# Patient Record
Sex: FEMALE | Race: WHITE | NOT HISPANIC OR LATINO | Employment: OTHER | URBAN - METROPOLITAN AREA
[De-identification: names, ages, dates, MRNs, and addresses within clinical notes are randomized per-mention and may not be internally consistent; named-entity substitution may affect disease eponyms.]

---

## 2021-03-03 ENCOUNTER — TELEPHONE (OUTPATIENT)
Dept: ORTHOPEDIC SURGERY | Facility: CLINIC | Age: 71
End: 2021-03-03

## 2021-03-31 ENCOUNTER — OFFICE VISIT (OUTPATIENT)
Dept: ORTHOPEDIC SURGERY | Facility: CLINIC | Age: 71
End: 2021-03-31

## 2021-03-31 VITALS
BODY MASS INDEX: 32.82 KG/M2 | SYSTOLIC BLOOD PRESSURE: 124 MMHG | DIASTOLIC BLOOD PRESSURE: 92 MMHG | WEIGHT: 204.2 LBS | HEIGHT: 66 IN

## 2021-03-31 DIAGNOSIS — I87.8 VENOUS STASIS: ICD-10-CM

## 2021-03-31 DIAGNOSIS — M25.571 PAIN IN RIGHT ANKLE AND JOINTS OF RIGHT FOOT: Primary | ICD-10-CM

## 2021-03-31 DIAGNOSIS — M76.821 POSTERIOR TIBIAL TENDINITIS OF RIGHT LOWER EXTREMITY: ICD-10-CM

## 2021-03-31 PROCEDURE — 99204 OFFICE O/P NEW MOD 45 MIN: CPT | Performed by: ORTHOPAEDIC SURGERY

## 2021-03-31 RX ORDER — ACETAMINOPHEN 500 MG
500 TABLET ORAL EVERY 6 HOURS PRN
COMMUNITY

## 2021-03-31 RX ORDER — OMEPRAZOLE 20 MG/1
CAPSULE, DELAYED RELEASE ORAL
COMMUNITY
Start: 2021-01-21

## 2021-03-31 RX ORDER — MULTIPLE VITAMINS W/ MINERALS TAB 9MG-400MCG
1 TAB ORAL DAILY
COMMUNITY

## 2021-03-31 RX ORDER — LEVOTHYROXINE SODIUM 0.07 MG/1
TABLET ORAL
COMMUNITY
Start: 2021-02-07

## 2021-03-31 RX ORDER — APIXABAN 5 MG/1
TABLET, FILM COATED ORAL
COMMUNITY
Start: 2021-01-03

## 2021-03-31 NOTE — PROGRESS NOTES
NEW PATIENT    Patient: Meena Davey  : 1950    Primary Care Provider: Washington Spence MD    Requesting Provider: As above    Pain of the Right Foot and Pain of the Right Ankle      History    Chief Complaint: Right hindfoot pain    History of Present Illness: This is a very pleasant 71-year-old woman here today for a second opinion regarding right hindfoot pain.  Its been going on more than 2 years.  We have notes from podiatry indicating they were treating her for posterior tibial tendinitis.  She has a brace and custom orthotics.  Unfortunately she did not bring those with her.  She reports they make the pain worse.  The records cover the time from May 2019 through 2019, multiple visits and I went through all of them.  They are somewhat difficult to follow.  We do not have any radiographic studies.  She reports that she has pain medially over the right ankle and laterally in the sinus Tarsi region.  It is worse with activity better with rest.  She reports the pain is 7 out of 10 sharp and aching.  She has a history of some type of fracture in the left foot and prior hammertoe and tendon surgery.  She has bilateral bunions also.  She does wear good shoes.    Current Outpatient Medications on File Prior to Visit   Medication Sig Dispense Refill   • acetaminophen (TYLENOL) 500 MG tablet Take 500 mg by mouth Every 6 (Six) Hours As Needed for Mild Pain .     • Eliquis 5 MG tablet tablet      • levothyroxine (SYNTHROID, LEVOTHROID) 75 MCG tablet      • multivitamin with minerals tablet tablet Take 1 tablet by mouth Daily.     • omeprazole (priLOSEC) 20 MG capsule        No current facility-administered medications on file prior to visit.      No Known Allergies   Past Medical History:   Diagnosis Date   • Gout    • H/O blood clots    • Thyroid activity decreased      Past Surgical History:   Procedure Laterality Date   • CHOLECYSTECTOMY     • FOOT SURGERY Left    • HYSTEROTOMY       Family  "History   Problem Relation Age of Onset   • No Known Problems Mother    • No Known Problems Father       Social History     Socioeconomic History   • Marital status:      Spouse name: Not on file   • Number of children: Not on file   • Years of education: Not on file   • Highest education level: Not on file   Tobacco Use   • Smoking status: Former Smoker   • Smokeless tobacco: Never Used   Substance and Sexual Activity   • Alcohol use: Never   • Drug use: Never   • Sexual activity: Defer        Review of Systems   Constitutional: Negative.    HENT: Negative.    Eyes: Negative.    Respiratory: Negative.    Cardiovascular: Negative.    Gastrointestinal: Negative.    Endocrine: Negative.    Genitourinary: Negative.    Musculoskeletal: Positive for arthralgias.   Skin: Negative.    Allergic/Immunologic: Negative.    Neurological: Negative.    Hematological: Negative.    Psychiatric/Behavioral: Negative.        The following portions of the patient's history were reviewed and updated as appropriate: allergies, current medications, past family history, past medical history, past social history, past surgical history and problem list.    Physical Exam:   /92   Ht 167.6 cm (66\")   Wt 92.6 kg (204 lb 3.2 oz)   BMI 32.96 kg/m²   GENERAL: Body habitus: obese    Lower extremity edema: Right: 1+ pitting; Left: 1+ pitting    Varicose veins:  Right: mild; Left: mild    Gait: antalgic with the first few steps     Mental Status:  awake and alert; oriented to person, place, and time    Voice:  clear  SKIN:  Lower extremity: warm and dry    Hair Growth(lower extremity):  Right:diminished; Left:  diminished  NAILS: Toenails: normal  HEENT: Head: Normocephalic, atraumatic,  without obvious abnormality.  eye: normal external eye, no icterus  ears:normal external ears  PULM:  Repiratory effort normal  CV:  Dorsalis Pedis:  Right: 2+; Left:2+    Posterior Tibial: Right:2+; Left:2+    Capillary Refill:  " Brisk  MSK:  Hand:right handed and moderate arthritis      Tibia:  Right:  non tender; Left:  non tender      Ankle:  Right: She is not tender in the ankle itself, she is tender over the talonavicular joint and tender in the sinus Tarsi and at the tip of the fibula, not really tender over the posterior tibial tendon today, the arch does reconstitute with manipulation; Left:  non tender      Foot:  Right:  As above she is tender over the talonavicular joint and the sinus Tarsi and tip of the fibula.  I can manipulate the arch back into normal alignment, it partially reduces with nonweightbearing, fairly significant hallux valgus metatarsus primus varus but the bunion is nontender moderate hammertoes but nontender; Left:  No tenderness, stiff second and third hammertoes that sits dorsiflexed, healed dorsal incisions on second and third toes, fairly significant hallux valgus metatarsus primus varus but not tender over the bunion      NEURO: Heel Walking:  Right:  Difficulty with balance but she can get her forefoot off the ground; Left:  Difficulty with balance but she can get her forefoot off the ground    Toe Walking:  Right:  no inversion with single leg toe raise, positive too many toes sign and limited ability, painful; Left:  no inversion with single leg toe raise     [place] 5.07 monofilament test: normal    Lower extremity sensation: intact     Reflexes:  Biceps:  Right:  not tested; Left:  not tested           Quads:  Right:  not tested; Left:  not tested           Ankle:  Right:  not tested; Left:  not tested      Calf Atrophy:none    Motor Function: all 5/5         Medical Decision Making    Data Review:   ordered and reviewed x-rays today and reviewed outside records I went over the podiatry records, they are confusing but it is evident they were trying to treat posterior tibial tendinitis, for the past 2 years    Assessment and Plan/ Diagnosis/Treatment options:   1. Posterior tibial tendinitis of  right lower extremity  I think this is old posterior tibial tendinitis, she has had symptoms for at least 2 years.  I went over everything with the patient and then she asked me if her sister could get on speaker phone which we did.  I therefore went over the whole problem twice.  I explained posterior tibial tendon dysfunction, I explained how the tendon stretches out and leads to increased flattening of the foot.  I would like to be able to evaluate her brace and orthotic to see how effective they may be.  At this point I think there are 3 options for treatment.  Before we make a treatment decision we need to do an MRI to evaluate the tendon.  I suspect it will be stretched out but not significantly inflamed.  I explained that her pain is mostly over the talonavicular joint and laterally where she has hindfoot impingement, therefore trying to address the tendon might not be very helpful.  I explained that in general if I see somebody with acute posterior tibial tendinitis I put them in a cast for 6 weeks, then a boot with custom orthotic doing PT 6 weeks, then 6 weeks of PT out of the boot.  Given that this is somewhat burned-out I am not certain that regimen would be helpful for her.  Another option for treatment is a PTB brace.  I need to see what type of brace she has.  A third option would be triple arthrodesis.  I went over everything in detail twice both with the patient and then the patient and her sister.  We will obtain an MRI to evaluate the tendon, and at next visit she will bring her orthotics in brace so that I can see it.  - MRI Ankle Right Without Contrast; Future    2. Venous stasis  I explained edema and venous stasis to the patient, and the often hereditary nature of the problem, and the contribution of weight, trauma, etc. I explained how they cause edema (due to gravity, etc) I explained how they can lead to ulceration.  I explained how they can cause pain, stiffness, aching, cramping, etc. I  explained that it is a very very common problem, so common that even [employer] compression stockings.  I recommend wearing support stockings (compression stockings) daily, we discussed what type and where to find them                Radiology Ordered []  Radiology Reports Reviewed []      Radiology Images Reviewed []   Labs Reviewed []    Labs Ordered []   PCP Records Reviewed []    Provider Records Reviewed []    ER Records Reviewed []    Hospital Records Reviewed []    History Obtained From Family []    Phone conversation with Provider []    Records Requested []        Allyson Martinez MD

## 2021-05-13 DIAGNOSIS — M76.821 POSTERIOR TIBIAL TENDINITIS OF RIGHT LOWER EXTREMITY: ICD-10-CM

## 2021-05-13 DIAGNOSIS — M25.571 PAIN IN RIGHT ANKLE AND JOINTS OF RIGHT FOOT: ICD-10-CM

## 2021-05-17 ENCOUNTER — OFFICE VISIT (OUTPATIENT)
Dept: ORTHOPEDIC SURGERY | Facility: CLINIC | Age: 71
End: 2021-05-17

## 2021-05-17 VITALS
DIASTOLIC BLOOD PRESSURE: 79 MMHG | SYSTOLIC BLOOD PRESSURE: 131 MMHG | HEIGHT: 66 IN | WEIGHT: 201.2 LBS | HEART RATE: 92 BPM | BODY MASS INDEX: 32.33 KG/M2

## 2021-05-17 DIAGNOSIS — I87.8 VENOUS STASIS: ICD-10-CM

## 2021-05-17 DIAGNOSIS — M76.821 POSTERIOR TIBIAL TENDINITIS OF RIGHT LOWER EXTREMITY: Primary | ICD-10-CM

## 2021-05-17 PROCEDURE — 99213 OFFICE O/P EST LOW 20 MIN: CPT | Performed by: ORTHOPAEDIC SURGERY

## 2021-05-17 RX ORDER — LEVOCETIRIZINE DIHYDROCHLORIDE 5 MG/1
TABLET, FILM COATED ORAL
COMMUNITY
Start: 2021-04-20 | End: 2022-03-25

## 2021-05-17 NOTE — PROGRESS NOTES
ESTABLISHED PATIENT    Patient: Meena Davey  : 1950    Primary Care Provider: Washington Spence MD    Requesting Provider: As above    Follow-up (MRI (5/10/21) follow up; Posterior tibial tendinitis of right lower extremity)      History    Chief Complaint: Right hindfoot pain    History of Present Illness: She returns for follow-up of the MRI of the right ankle.  It was done at another hospital.  I looked at the films and the report, dated 5/10/2021.  The MRI shows some subchondral edema at the subtalar and calcaneocuboid joint consistent with arthritis as expected, it shows some thickening of the peroneus longus tendon.  It shows fluid around the posterior tibial tendon with some mild central degeneration, indicating that tendinitis is still active.    Current Outpatient Medications on File Prior to Visit   Medication Sig Dispense Refill   • acetaminophen (TYLENOL) 500 MG tablet Take 500 mg by mouth Every 6 (Six) Hours As Needed for Mild Pain .     • Eliquis 5 MG tablet tablet      • levocetirizine (XYZAL) 5 MG tablet      • levothyroxine (SYNTHROID, LEVOTHROID) 75 MCG tablet      • multivitamin with minerals tablet tablet Take 1 tablet by mouth Daily.     • omeprazole (priLOSEC) 20 MG capsule        No current facility-administered medications on file prior to visit.      No Known Allergies   Past Medical History:   Diagnosis Date   • Gout    • H/O blood clots    • Thyroid activity decreased      Past Surgical History:   Procedure Laterality Date   • CHOLECYSTECTOMY     • FOOT SURGERY Left    • HYSTEROTOMY       Family History   Problem Relation Age of Onset   • No Known Problems Mother    • No Known Problems Father       Social History     Socioeconomic History   • Marital status:      Spouse name: Not on file   • Number of children: Not on file   • Years of education: Not on file   • Highest education level: Not on file   Tobacco Use   • Smoking status: Former Smoker   • Smokeless tobacco:  "Never Used   Substance and Sexual Activity   • Alcohol use: Never   • Drug use: Never   • Sexual activity: Defer        Review of Systems   Constitutional: Negative.    HENT: Negative.    Eyes: Negative.    Respiratory: Negative.    Cardiovascular: Negative.    Gastrointestinal: Negative.    Endocrine: Negative.    Genitourinary: Negative.    Musculoskeletal: Positive for arthralgias.   Skin: Negative.    Allergic/Immunologic: Negative.    Neurological: Negative.    Hematological: Negative.    Psychiatric/Behavioral: Negative.        The following portions of the patient's history were reviewed and updated as appropriate: allergies, current medications, past family history, past medical history, past social history, past surgical history and problem list.    Physical Exam:   /79   Pulse 92   Ht 167.6 cm (65.98\")   Wt 91.3 kg (201 lb 3.2 oz)   BMI 32.49 kg/m²   GENERAL: Gait: antalgic       MSK:  Ankle:  Right: Tender over the posterior tib tendon and in the sinus Tarsi;         She is wearing compression socks, swelling is much better controlled    NEURO Sensation:  intact     Medical Decision Making    Data Review:   reviewed radiology images and reviewed radiology results    Assessment/Plan/Diagnosis/Treatment Options:   1. Posterior tibial tendinitis of right lower extremity  The MRI was helpful, it showed her posterior tibial tendinitis is active still.  Therefore I would start treating this as an active posterior tibial tendon problem.  I would recommend the cast for 6 weeks.  It is a short leg fiberglass walking cast and it was applied today.  Once the cast is done she will go in a tall boot with a new custom orthotic.  I gave her prescription for the orthotics, she should make an appointment to be fitted for these the day she comes out of the cast.  She will do 6 weeks of PT in the boot and then 6 weeks out of the boot.  She did bring the braces with her today that were prescribed, they are hard " plastic UCBL type inserts that come up to the malleoli, they are not the type that I think would be comfortable for this problem.  She reports she was unable to wear them.  She got her sister on speaker phone and I went over everything in detail.  I will see her again in 6 weeks for an exam out of the cast    2. Venous stasis  Excellent that she is wearing her compression socks today

## 2021-06-28 ENCOUNTER — OFFICE VISIT (OUTPATIENT)
Dept: ORTHOPEDIC SURGERY | Facility: CLINIC | Age: 71
End: 2021-06-28

## 2021-06-28 VITALS
HEIGHT: 66 IN | SYSTOLIC BLOOD PRESSURE: 140 MMHG | DIASTOLIC BLOOD PRESSURE: 76 MMHG | HEART RATE: 83 BPM | BODY MASS INDEX: 32.3 KG/M2 | WEIGHT: 201 LBS

## 2021-06-28 DIAGNOSIS — I87.8 VENOUS STASIS: ICD-10-CM

## 2021-06-28 DIAGNOSIS — M76.821 POSTERIOR TIBIAL TENDINITIS OF RIGHT LOWER EXTREMITY: Primary | ICD-10-CM

## 2021-06-28 PROCEDURE — 99213 OFFICE O/P EST LOW 20 MIN: CPT | Performed by: ORTHOPAEDIC SURGERY

## 2021-06-28 NOTE — PROGRESS NOTES
ESTABLISHED PATIENT    Patient: Meena Davey  : 1950    Primary Care Provider: Washington Spence MD    Requesting Provider: As above    Follow-up (6 week f/u; Posterior tibial tendinitis of right lower extremity )      History    Chief Complaint: She returns for casting for 6 weeks for right posterior tibial tendinitis she is much improved    History of Present Illness: She returns for casting for 6 weeks right posterior tibial tendinitis, much improved    Current Outpatient Medications on File Prior to Visit   Medication Sig Dispense Refill   • acetaminophen (TYLENOL) 500 MG tablet Take 500 mg by mouth Every 6 (Six) Hours As Needed for Mild Pain .     • Eliquis 5 MG tablet tablet      • levocetirizine (XYZAL) 5 MG tablet      • levothyroxine (SYNTHROID, LEVOTHROID) 75 MCG tablet      • multivitamin with minerals tablet tablet Take 1 tablet by mouth Daily.     • omeprazole (priLOSEC) 20 MG capsule        No current facility-administered medications on file prior to visit.      No Known Allergies   Past Medical History:   Diagnosis Date   • Gout    • H/O blood clots    • Thyroid activity decreased      Past Surgical History:   Procedure Laterality Date   • CHOLECYSTECTOMY     • FOOT SURGERY Left    • HYSTEROTOMY       Family History   Problem Relation Age of Onset   • No Known Problems Mother    • No Known Problems Father       Social History     Socioeconomic History   • Marital status:      Spouse name: Not on file   • Number of children: Not on file   • Years of education: Not on file   • Highest education level: Not on file   Tobacco Use   • Smoking status: Former Smoker   • Smokeless tobacco: Never Used   Substance and Sexual Activity   • Alcohol use: Never   • Drug use: Never   • Sexual activity: Defer        Review of Systems   Constitutional: Negative.    HENT: Negative.    Eyes: Negative.    Respiratory: Negative.    Cardiovascular: Negative.    Gastrointestinal: Negative.    Endocrine:  "Negative.    Genitourinary: Negative.    Musculoskeletal: Positive for arthralgias.   Skin: Negative.    Allergic/Immunologic: Negative.    Neurological: Negative.    Hematological: Negative.    Psychiatric/Behavioral: Negative.        The following portions of the patient's history were reviewed and updated as appropriate: allergies, current medications, past family history, past medical history, past social history, past surgical history and problem list.    Physical Exam:   /76   Pulse 83   Ht 167.6 cm (65.98\")   Wt 91.2 kg (201 lb)   BMI 32.46 kg/m²   GENERAL: Body habitus: obese    Lower extremity edema: Left: none; Right: none    Gait: normal     Mental Status:  awake and alert; oriented to person, place, and time  MSK:  Tibia:  Right:  non tender; Left:  non tender        Ankle:  Right: No longer tender over the posterior tibial tendon; Left:  non tender        Foot:  Right:  non tender; Left:  non tender    NEURO Sensation:  intact    Medical Decision Making    Data Review:   none    Assessment/Plan/Diagnosis/Treatment Options:   1. Posterior tibial tendinitis of right lower extremity  She is much improved.  She will now go in a tall boot and start PT.  She has the prescription for custom orthotic.  She will wear the orthotic in the boot initially.  She will do this for 6 weeks and then 6 more weeks of PT out of the boot.  I will see her when that is complete  - Ambulatory Referral to Physical Therapy    2. Venous stasis  Continue to wear compression stockings        Allyson Martinez MD                      "

## 2021-09-29 ENCOUNTER — OFFICE VISIT (OUTPATIENT)
Dept: ORTHOPEDIC SURGERY | Facility: CLINIC | Age: 71
End: 2021-09-29

## 2021-09-29 VITALS
HEIGHT: 66 IN | WEIGHT: 199.2 LBS | SYSTOLIC BLOOD PRESSURE: 134 MMHG | DIASTOLIC BLOOD PRESSURE: 84 MMHG | BODY MASS INDEX: 32.02 KG/M2 | HEART RATE: 86 BPM

## 2021-09-29 DIAGNOSIS — I87.8 VENOUS STASIS: ICD-10-CM

## 2021-09-29 DIAGNOSIS — M76.821 POSTERIOR TIBIAL TENDINITIS OF RIGHT LOWER EXTREMITY: Primary | ICD-10-CM

## 2021-09-29 PROCEDURE — 99212 OFFICE O/P EST SF 10 MIN: CPT | Performed by: ORTHOPAEDIC SURGERY

## 2021-09-29 NOTE — PROGRESS NOTES
ESTABLISHED PATIENT    Patient: Meena Davey  : 1950    Primary Care Provider: Washington Spence MD    Requesting Provider: As above    Follow-up (3 months- Posterior tibial tendinitis of right lower extremity)      History    Chief Complaint: Right ankle pain    History of Present Illness: She returns for follow-up of treatment for her right posterior tibial tendinitis.  She reports the pain has resolved.  She has her custom orthotics.  She is wearing her compression stockings.    Current Outpatient Medications on File Prior to Visit   Medication Sig Dispense Refill   • acetaminophen (TYLENOL) 500 MG tablet Take 500 mg by mouth Every 6 (Six) Hours As Needed for Mild Pain .     • Eliquis 5 MG tablet tablet      • levocetirizine (XYZAL) 5 MG tablet      • levothyroxine (SYNTHROID, LEVOTHROID) 75 MCG tablet      • multivitamin with minerals tablet tablet Take 1 tablet by mouth Daily.     • omeprazole (priLOSEC) 20 MG capsule        No current facility-administered medications on file prior to visit.      No Known Allergies   Past Medical History:   Diagnosis Date   • Gout    • H/O blood clots    • Thyroid activity decreased      Past Surgical History:   Procedure Laterality Date   • CHOLECYSTECTOMY     • FOOT SURGERY Left    • HYSTEROTOMY       Family History   Problem Relation Age of Onset   • No Known Problems Mother    • No Known Problems Father       Social History     Socioeconomic History   • Marital status:      Spouse name: Not on file   • Number of children: Not on file   • Years of education: Not on file   • Highest education level: Not on file   Tobacco Use   • Smoking status: Former Smoker   • Smokeless tobacco: Never Used   Substance and Sexual Activity   • Alcohol use: Never   • Drug use: Never   • Sexual activity: Defer        Review of Systems   Constitutional: Negative.    HENT: Negative.    Eyes: Negative.    Respiratory: Negative.    Cardiovascular: Negative.   "  Gastrointestinal: Negative.    Endocrine: Negative.    Genitourinary: Negative.    Musculoskeletal: Positive for arthralgias.   Skin: Negative.    Allergic/Immunologic: Negative.    Neurological: Negative.    Hematological: Negative.    Psychiatric/Behavioral: Negative.        The following portions of the patient's history were reviewed and updated as appropriate: allergies, current medications, past family history, past medical history, past social history, past surgical history and problem list.    Physical Exam:   /84   Pulse 86   Ht 167.6 cm (65.98\")   Wt 90.4 kg (199 lb 3.2 oz)   BMI 32.17 kg/m²   GENERAL: Body habitus: overweight    Lower extremity edema: Left: none; Right: none    Gait: normal     Mental Status:  awake and alert; oriented to person, place, and time  MSK:  Tibia:  Right:  non tender; Left:  non tender        Ankle:  Right: non tender; Left:  non tender        Foot:  Right:  non tender over the posterior tibial tendon, she is able to do a single and double leg toe raise; Left:  non tender    NEURO Sensation:  intact    Medical Decision Making    Data Review:   none    Assessment/Plan/Diagnosis/Treatment Options:   1. Posterior tibial tendinitis of right lower extremity  She is significantly improved.  Continue the exercises on her own, continue the orthotics, I will be happy to see her anytime    2. Venous stasis  Definitely continue to wear compression socks        Allyson Martinez MD                      "

## 2022-03-25 ENCOUNTER — OFFICE VISIT (OUTPATIENT)
Dept: ORTHOPEDIC SURGERY | Facility: CLINIC | Age: 72
End: 2022-03-25

## 2022-03-25 VITALS
HEIGHT: 66 IN | WEIGHT: 186 LBS | BODY MASS INDEX: 29.89 KG/M2 | SYSTOLIC BLOOD PRESSURE: 110 MMHG | DIASTOLIC BLOOD PRESSURE: 84 MMHG

## 2022-03-25 DIAGNOSIS — M77.42 METATARSALGIA OF LEFT FOOT: ICD-10-CM

## 2022-03-25 DIAGNOSIS — M79.672 LEFT FOOT PAIN: Primary | ICD-10-CM

## 2022-03-25 DIAGNOSIS — M19.072 ARTHRITIS OF LEFT FOOT: ICD-10-CM

## 2022-03-25 PROCEDURE — 99213 OFFICE O/P EST LOW 20 MIN: CPT | Performed by: PHYSICIAN ASSISTANT

## 2022-03-25 NOTE — PROGRESS NOTES
[facility] Note        Subjective     Pain of the Left Foot      HPI    Meena Davey is a 72 y.o. female.  Patient returns today with new problem.  She is here with complaints of left forefoot pain.  She has pain with weightbearing and walking no rest pain or night pain.  She has developed callus under the second and third metatarsal heads.  She had surgery in the 90s for bunion and hammertoes.  She has been using metatarsal pad and has custom orthotics from [employer].  She reports the pad it significantly helps.    Past Medical History:   Diagnosis Date   • Gout    • H/O blood clots    • Thyroid activity decreased       Past Surgical History:   Procedure Laterality Date   • CHOLECYSTECTOMY     • FOOT SURGERY Left    • HYSTEROTOMY        Family History   Problem Relation Age of Onset   • No Known Problems Mother    • No Known Problems Father      Social History     Socioeconomic History   • Marital status:    Tobacco Use   • Smoking status: Former Smoker   • Smokeless tobacco: Never Used   Substance and Sexual Activity   • Alcohol use: Never   • Drug use: Never   • Sexual activity: Defer      Current Outpatient Medications on File Prior to Visit   Medication Sig Dispense Refill   • acetaminophen (TYLENOL) 500 MG tablet Take 500 mg by mouth Every 6 (Six) Hours As Needed for Mild Pain .     • Eliquis 5 MG tablet tablet      • levothyroxine (SYNTHROID, LEVOTHROID) 75 MCG tablet      • multivitamin with minerals tablet tablet Take 1 tablet by mouth Daily.     • omeprazole (priLOSEC) 20 MG capsule      • [DISCONTINUED] levocetirizine (XYZAL) 5 MG tablet        No current facility-administered medications on file prior to visit.      No Known Allergies       Review of Systems   Constitutional: Negative.    HENT: Negative.    Eyes: Negative.    Respiratory: Negative.    Cardiovascular: Negative.    Gastrointestinal: Negative.    Endocrine: Negative.    Genitourinary: Negative.   "  Musculoskeletal: Positive for arthralgias.   Skin: Negative.    Allergic/Immunologic: Negative.    Neurological: Negative.    Hematological: Negative.    Psychiatric/Behavioral: Negative.         I reviewed the patient's chief complaint, history of present illness, review of systems, past medical history, surgical history, family history, social history, medications and allergy list.        Objective      Physical Exam  /84   Ht 167.6 cm (65.98\")   Wt 84.4 kg (186 lb)   BMI 30.04 kg/m²     Body mass index is 30.04 kg/m².    General  Mental Status - alert  General Appearance - cooperative, well groomed, not in acute distress  Orientation - Oriented X3  Build & Nutrition - well developed and well nourished  Posture - normal posture  Gait -normal       Ortho Exam  Left foot exam: Tender under the second and third most metatarsal heads with calluses.  Second and third toes subluxed.  Nontender with no swelling.  Neurovascular intact distally.    Imaging/Studies  Imaging Results (Last 24 Hours)     Procedure Component Value Units Date/Time    XR Foot 2 View Left [[id number]] Resulted: 03/25/22 0913     Updated: 03/25/22 0916    Narrative:      Indication: Left foot pain    Comparison: No prior xrays are available for comparison      Impression:           Left foot 3 views: Radiographs demonstrate evidence of prior first   metatarsal osteotomy with retained hardware there is well-healed.  There   is also hardware in first proximal phalanx from suspected osteotomy with   no apparent hardware complication.  There is pes planus alignment as well   with moderate midfoot degenerative changes.  No acute bony injury or   fracture.              Assessment    Assessment:  1. Left foot pain    2. Metatarsalgia of left foot    3. Arthritis of left foot          Plan:  1. Recommend over-the-counter medication as needed for discomfort  2. Left foot metatarsalgia with midfoot arthritis.  Reviewed today's x-rays clinical " findings past and current treatment the patient.  I explained that the calluses secondary to pressure and given her subluxed or dislocated toes she is essentially walking on her metatarsal heads.  She also has thin fat pads.  I recommended she use a pad or pumice stone daily.  I have given her a couple more metatarsal pads that she reports these help a lot and she can order others online.  I also told her I can give her a prescription to have her orthotics adjusted.  She will see us back as needed.    History, diagnosis and treatment plan discussed with Dr. Dupree.            Leigh Ann Mcwilliams PA-C  03/25/22  09:22 EDT